# Patient Record
Sex: MALE | Race: WHITE | HISPANIC OR LATINO | URBAN - METROPOLITAN AREA
[De-identification: names, ages, dates, MRNs, and addresses within clinical notes are randomized per-mention and may not be internally consistent; named-entity substitution may affect disease eponyms.]

---

## 2019-09-07 ENCOUNTER — EMERGENCY (EMERGENCY)
Facility: HOSPITAL | Age: 39
LOS: 1 days | Discharge: HOME | End: 2019-09-07
Attending: EMERGENCY MEDICINE
Payer: MEDICAID

## 2019-09-07 VITALS
WEIGHT: 169.98 LBS | DIASTOLIC BLOOD PRESSURE: 64 MMHG | HEART RATE: 77 BPM | HEIGHT: 67 IN | SYSTOLIC BLOOD PRESSURE: 106 MMHG | TEMPERATURE: 97 F | RESPIRATION RATE: 18 BRPM | OXYGEN SATURATION: 97 %

## 2019-09-07 PROCEDURE — 12001 RPR S/N/AX/GEN/TRNK 2.5CM/<: CPT

## 2019-09-07 PROCEDURE — 99283 EMERGENCY DEPT VISIT LOW MDM: CPT | Mod: 25

## 2019-09-08 PROCEDURE — 90471 IMMUNIZATION ADMIN: CPT

## 2019-09-08 PROCEDURE — 99283 EMERGENCY DEPT VISIT LOW MDM: CPT | Mod: 25

## 2019-09-08 PROCEDURE — 96372 THER/PROPH/DIAG INJ SC/IM: CPT | Mod: XU

## 2019-09-08 PROCEDURE — 90715 TDAP VACCINE 7 YRS/> IM: CPT

## 2019-09-08 PROCEDURE — 12001 RPR S/N/AX/GEN/TRNK 2.5CM/<: CPT

## 2019-09-08 PROCEDURE — 73140 X-RAY EXAM OF FINGER(S): CPT

## 2019-09-08 PROCEDURE — 73140 X-RAY EXAM OF FINGER(S): CPT | Mod: 26,LT

## 2019-09-08 RX ORDER — CEPHALEXIN 500 MG
500 CAPSULE ORAL
Refills: 0 | Status: DISCONTINUED | OUTPATIENT
Start: 2019-09-08 | End: 2019-09-14

## 2019-09-08 RX ORDER — TETANUS TOXOID, REDUCED DIPHTHERIA TOXOID AND ACELLULAR PERTUSSIS VACCINE, ADSORBED 5; 2.5; 8; 8; 2.5 [IU]/.5ML; [IU]/.5ML; UG/.5ML; UG/.5ML; UG/.5ML
0.5 SUSPENSION INTRAMUSCULAR ONCE
Refills: 0 | Status: COMPLETED | OUTPATIENT
Start: 2019-09-08 | End: 2019-09-08

## 2019-09-08 RX ORDER — CEPHALEXIN 500 MG
1 CAPSULE ORAL
Qty: 40 | Refills: 0
Start: 2019-09-08 | End: 2019-09-17

## 2019-09-08 RX ORDER — KETOROLAC TROMETHAMINE 30 MG/ML
60 SYRINGE (ML) INJECTION ONCE
Refills: 0 | Status: DISCONTINUED | OUTPATIENT
Start: 2019-09-08 | End: 2019-09-08

## 2019-09-08 RX ORDER — CEPHALEXIN 500 MG
1 CAPSULE ORAL
Qty: 28 | Refills: 0
Start: 2019-09-08 | End: 2019-09-14

## 2019-09-08 RX ORDER — LIDOCAINE HCL 20 MG/ML
5 VIAL (ML) INJECTION ONCE
Refills: 0 | Status: COMPLETED | OUTPATIENT
Start: 2019-09-08 | End: 2019-09-08

## 2019-09-08 RX ADMIN — Medication 500 MILLIGRAM(S): at 03:35

## 2019-09-08 RX ADMIN — TETANUS TOXOID, REDUCED DIPHTHERIA TOXOID AND ACELLULAR PERTUSSIS VACCINE, ADSORBED 0.5 MILLILITER(S): 5; 2.5; 8; 8; 2.5 SUSPENSION INTRAMUSCULAR at 03:35

## 2019-09-08 RX ADMIN — Medication 60 MILLIGRAM(S): at 01:55

## 2019-09-08 NOTE — ED PROVIDER NOTE - OBJECTIVE STATEMENT
The patient is a 39y Male complaining of finger pain/injury. Pt is a 39 y.o. M complaining of finger injury. The patient injured his thumb on his left hand while using a household hammer. He denies any current bleeding, although there was some before he came to the hospital. Denies any numbness or weakness. Pain rated 8/10 and described as burning. Unsure of when last tetanus shot was.

## 2019-09-08 NOTE — ED PROVIDER NOTE - NS ED ROS FT
Constitutional: no fever, sweats, and no chills.  Eyes: no pain, no redness, and no visual changes.  ENMT: no ear pain and no hearing problems, no nasal congestion/drainage, no dysphagia, and no throat pain, no neck pain, no stiffness  CV: no chest pain, no edema.  Resp: no cough, no dyspnea  GI: no abdominal pain, no constipation, no diarrhea, no nausea and no vomiting.  MSK: no weakness  Skin: no jaundice, no lesions, and no rashes.  Neuro: no LOC, no headache, no sensory deficits, and no weakness.  Psych: no known mental health issues  Endo: no diabetes and no thyroid trouble.

## 2019-09-08 NOTE — ED PROVIDER NOTE - PATIENT PORTAL LINK FT
You can access the FollowMyHealth Patient Portal offered by [facility] by registering at the following website: [email or web address]. By joining [employer]’s FollowMyHealth portal, you will also be able to view your health information using other applications (apps) compatible with our system.

## 2019-09-08 NOTE — ED PROVIDER NOTE - PHYSICAL EXAMINATION
General: well appearing, NAD  Head:  NC, AT  Eyes: EOMI, PERRLA, no scleral icterus  Ears: bilateral clear tympanic membranes, no erythema/drainage  Nose: midline, normal turbinates, no bleeding/drainage  Throat: uvula midline, no lesions, MMM  Cardiac: RRR, no m/r/g, no lower extremity edema  Respiratory: CTABL, no wheezes/rales, equal chest wall expansions  Abdomen: soft, ND, NT, no rebound tenderness, no guarding, nonperitonitic  MSK/Vascular: full ROM, distal pulses intact, soft compartments, warm extremities, LUE: thumb with laceration at the level of the cuticle, nail not affected, radial pulse intact, motor/sensory intact  Neuro: AAO, motor/sensory intact  Psych: calm, cooperative, normal affect

## 2019-09-08 NOTE — ED PROCEDURE NOTE - ATTENDING CONTRIBUTION TO CARE
wound care discussed with pateint, I personally saw the patient with the PA, and completed the key components of the history and physical exam. I then discussed the management plan with the PA. I personally saw the patient with the resident, and completed the key components of the history and physical exam. I then discussed the management plan with the resident.

## 2019-09-08 NOTE — ED PROVIDER NOTE - ATTENDING CONTRIBUTION TO CARE
39y odl with complaints to nail injury, plan und check, I personally saw the patient with the resident, and completed the key components of the history and physical exam. I then discussed the management plan with the resident.

## 2019-09-08 NOTE — ED PROVIDER NOTE - CLINICAL SUMMARY MEDICAL DECISION MAKING FREE TEXT BOX
Pt with laceration to cuticle of left thumb due to strike with hammer. No active bleeding noted, sensation/motor intact, palpable radial pulse 2+. Laceration repaired, see procedure note. Pt hand placed in thumb spica. Medications given for pain control and follow up with hand surgery given. Advised to also follow up with PMD. Tetanus shot given. Pt discharged. Pt with laceration to cuticle of left thumb due to strike with hammer. No active bleeding noted, sensation/motor intact, palpable radial pulse 2+. Laceration repaired, see procedure note. Pt hand placed in thumb spica. Medications given for pain control and follow up with hand surgery given. Advised to also follow up with PMD. Tetanus shot given. Pt discharged with antibiotics.

## 2022-05-17 PROBLEM — I10 ESSENTIAL (PRIMARY) HYPERTENSION: Chronic | Status: ACTIVE | Noted: 2019-09-08

## 2022-06-27 PROBLEM — Z00.00 ENCOUNTER FOR PREVENTIVE HEALTH EXAMINATION: Status: ACTIVE | Noted: 2022-06-27

## 2022-06-28 ENCOUNTER — APPOINTMENT (OUTPATIENT)
Dept: CARDIOLOGY | Facility: CLINIC | Age: 42
End: 2022-06-28